# Patient Record
Sex: MALE | Race: OTHER | Employment: STUDENT | URBAN - METROPOLITAN AREA
[De-identification: names, ages, dates, MRNs, and addresses within clinical notes are randomized per-mention and may not be internally consistent; named-entity substitution may affect disease eponyms.]

---
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
## 2023-05-22 ENCOUNTER — HOSPITAL ENCOUNTER (OUTPATIENT)
Age: 23
Discharge: HOME | End: 2023-05-22
Payer: COMMERCIAL

## 2023-05-22 VITALS
SYSTOLIC BLOOD PRESSURE: 124 MMHG | HEART RATE: 56 BPM | TEMPERATURE: 98 F | OXYGEN SATURATION: 98 % | RESPIRATION RATE: 18 BRPM | DIASTOLIC BLOOD PRESSURE: 60 MMHG

## 2023-05-22 DIAGNOSIS — M25.562 PAIN IN BOTH KNEES, UNSPECIFIED CHRONICITY: Primary | ICD-10-CM

## 2023-05-22 DIAGNOSIS — M25.561 PAIN IN BOTH KNEES, UNSPECIFIED CHRONICITY: Primary | ICD-10-CM

## 2023-05-22 PROCEDURE — 99203 OFFICE O/P NEW LOW 30 MIN: CPT | Performed by: NURSE PRACTITIONER

## 2023-05-22 NOTE — DISCHARGE INSTRUCTIONS
Take over-the-counter ibuprofen for pain or discomfort ice pack to the knees 2-3 times per day inside of a pillowcase or cough. Avoid any strenuous exercises such as lunges squats and running. Follow-up with your primary care provider 2 to 3 days for additional pain management options follow-up with the orthopedic specialist.  If you develop severe knee pain swelling the skin is red or hot to touch pus or drainage from the skin or fever this would be concerning for infection go to the nearest emergency department.

## 2023-05-22 NOTE — ED INITIAL ASSESSMENT (HPI)
Pt with intermittent bilateral knee pain pain x5-6 months ago. Pt tated pain 8/10. Pt denies trauma.

## 2023-06-12 ENCOUNTER — MED REC SCAN ONLY (OUTPATIENT)
Dept: FAMILY MEDICINE CLINIC | Facility: CLINIC | Age: 23
End: 2023-06-12

## 2024-05-07 ENCOUNTER — EKG ENCOUNTER (OUTPATIENT)
Dept: LAB | Age: 24
End: 2024-05-07
Attending: FAMILY MEDICINE
Payer: COMMERCIAL

## 2024-05-07 ENCOUNTER — OFFICE VISIT (OUTPATIENT)
Dept: FAMILY MEDICINE CLINIC | Facility: CLINIC | Age: 24
End: 2024-05-07
Payer: COMMERCIAL

## 2024-05-07 ENCOUNTER — LAB ENCOUNTER (OUTPATIENT)
Dept: LAB | Age: 24
End: 2024-05-07
Attending: FAMILY MEDICINE
Payer: COMMERCIAL

## 2024-05-07 VITALS
SYSTOLIC BLOOD PRESSURE: 130 MMHG | BODY MASS INDEX: 32.23 KG/M2 | HEIGHT: 72 IN | HEART RATE: 68 BPM | DIASTOLIC BLOOD PRESSURE: 81 MMHG | WEIGHT: 238 LBS

## 2024-05-07 DIAGNOSIS — H61.22 IMPACTED CERUMEN OF LEFT EAR: ICD-10-CM

## 2024-05-07 DIAGNOSIS — Z00.00 PHYSICAL EXAM: Primary | ICD-10-CM

## 2024-05-07 DIAGNOSIS — Z00.00 PHYSICAL EXAM: ICD-10-CM

## 2024-05-07 LAB
ALBUMIN SERPL-MCNC: 4.5 G/DL (ref 3.2–4.8)
ALBUMIN/GLOB SERPL: 1.6 {RATIO} (ref 1–2)
ALP LIVER SERPL-CCNC: 98 U/L
ALT SERPL-CCNC: 30 U/L
ANION GAP SERPL CALC-SCNC: 6 MMOL/L (ref 0–18)
AST SERPL-CCNC: 20 U/L (ref ?–34)
ATRIAL RATE: 48 BPM
BASOPHILS # BLD AUTO: 0.03 X10(3) UL (ref 0–0.2)
BASOPHILS NFR BLD AUTO: 0.5 %
BILIRUB SERPL-MCNC: 0.8 MG/DL (ref 0.3–1.2)
BILIRUB UR QL: NEGATIVE
BUN BLD-MCNC: 19 MG/DL (ref 9–23)
BUN/CREAT SERPL: 16.7 (ref 10–20)
CALCIUM BLD-MCNC: 9.6 MG/DL (ref 8.7–10.4)
CHLORIDE SERPL-SCNC: 109 MMOL/L (ref 98–112)
CHOLEST SERPL-MCNC: 174 MG/DL (ref ?–200)
CO2 SERPL-SCNC: 28 MMOL/L (ref 21–32)
COLOR UR: YELLOW
CREAT BLD-MCNC: 1.14 MG/DL
DEPRECATED RDW RBC AUTO: 41.1 FL (ref 35.1–46.3)
EGFRCR SERPLBLD CKD-EPI 2021: 92 ML/MIN/1.73M2 (ref 60–?)
EOSINOPHIL # BLD AUTO: 0.28 X10(3) UL (ref 0–0.7)
EOSINOPHIL NFR BLD AUTO: 4.9 %
ERYTHROCYTE [DISTWIDTH] IN BLOOD BY AUTOMATED COUNT: 13.2 % (ref 11–15)
EST. AVERAGE GLUCOSE BLD GHB EST-MCNC: 108 MG/DL (ref 68–126)
FASTING PATIENT LIPID ANSWER: YES
FASTING STATUS PATIENT QL REPORTED: YES
GLOBULIN PLAS-MCNC: 2.9 G/DL (ref 2–3.5)
GLUCOSE BLD-MCNC: 99 MG/DL (ref 70–99)
GLUCOSE UR-MCNC: NORMAL MG/DL
HBA1C MFR BLD: 5.4 % (ref ?–5.7)
HCT VFR BLD AUTO: 47.8 %
HDLC SERPL-MCNC: 63 MG/DL (ref 40–59)
HGB BLD-MCNC: 15.1 G/DL
HGB UR QL STRIP.AUTO: NEGATIVE
IMM GRANULOCYTES # BLD AUTO: 0.01 X10(3) UL (ref 0–1)
IMM GRANULOCYTES NFR BLD: 0.2 %
KETONES UR-MCNC: NEGATIVE MG/DL
LDLC SERPL CALC-MCNC: 97 MG/DL (ref ?–100)
LEUKOCYTE ESTERASE UR QL STRIP.AUTO: NEGATIVE
LYMPHOCYTES # BLD AUTO: 2.32 X10(3) UL (ref 1–4)
LYMPHOCYTES NFR BLD AUTO: 40.6 %
MCH RBC QN AUTO: 26.7 PG (ref 26–34)
MCHC RBC AUTO-ENTMCNC: 31.6 G/DL (ref 31–37)
MCV RBC AUTO: 84.6 FL
MONOCYTES # BLD AUTO: 0.45 X10(3) UL (ref 0.1–1)
MONOCYTES NFR BLD AUTO: 7.9 %
NEUTROPHILS # BLD AUTO: 2.62 X10 (3) UL (ref 1.5–7.7)
NEUTROPHILS # BLD AUTO: 2.62 X10(3) UL (ref 1.5–7.7)
NEUTROPHILS NFR BLD AUTO: 45.9 %
NITRITE UR QL STRIP.AUTO: NEGATIVE
NONHDLC SERPL-MCNC: 111 MG/DL (ref ?–130)
OSMOLALITY SERPL CALC.SUM OF ELEC: 298 MOSM/KG (ref 275–295)
P AXIS: 48 DEGREES
P-R INTERVAL: 204 MS
PH UR: 6 [PH] (ref 5–8)
PLATELET # BLD AUTO: 262 10(3)UL (ref 150–450)
POTASSIUM SERPL-SCNC: 4.3 MMOL/L (ref 3.5–5.1)
PROT SERPL-MCNC: 7.4 G/DL (ref 5.7–8.2)
PROT UR-MCNC: NEGATIVE MG/DL
Q-T INTERVAL: 408 MS
QRS DURATION: 92 MS
QTC CALCULATION (BEZET): 364 MS
R AXIS: 77 DEGREES
RBC # BLD AUTO: 5.65 X10(6)UL
SODIUM SERPL-SCNC: 143 MMOL/L (ref 136–145)
SP GR UR STRIP: 1.03 (ref 1–1.03)
T AXIS: 42 DEGREES
TRIGL SERPL-MCNC: 74 MG/DL (ref 30–149)
TSI SER-ACNC: 1.22 MIU/ML (ref 0.55–4.78)
UROBILINOGEN UR STRIP-ACNC: NORMAL
VENTRICULAR RATE: 48 BPM
VIT D+METAB SERPL-MCNC: 23.3 NG/ML (ref 30–100)
VLDLC SERPL CALC-MCNC: 12 MG/DL (ref 0–30)
WBC # BLD AUTO: 5.7 X10(3) UL (ref 4–11)

## 2024-05-07 PROCEDURE — 80053 COMPREHEN METABOLIC PANEL: CPT | Performed by: FAMILY MEDICINE

## 2024-05-07 PROCEDURE — 93010 ELECTROCARDIOGRAM REPORT: CPT | Performed by: INTERNAL MEDICINE

## 2024-05-07 PROCEDURE — 36415 COLL VENOUS BLD VENIPUNCTURE: CPT | Performed by: FAMILY MEDICINE

## 2024-05-07 PROCEDURE — 93005 ELECTROCARDIOGRAM TRACING: CPT

## 2024-05-07 PROCEDURE — 84443 ASSAY THYROID STIM HORMONE: CPT | Performed by: FAMILY MEDICINE

## 2024-05-07 PROCEDURE — 90471 IMMUNIZATION ADMIN: CPT | Performed by: FAMILY MEDICINE

## 2024-05-07 PROCEDURE — 80061 LIPID PANEL: CPT | Performed by: FAMILY MEDICINE

## 2024-05-07 PROCEDURE — 82306 VITAMIN D 25 HYDROXY: CPT

## 2024-05-07 PROCEDURE — 85025 COMPLETE CBC W/AUTO DIFF WBC: CPT | Performed by: FAMILY MEDICINE

## 2024-05-07 PROCEDURE — 83036 HEMOGLOBIN GLYCOSYLATED A1C: CPT | Performed by: FAMILY MEDICINE

## 2024-05-07 PROCEDURE — 99385 PREV VISIT NEW AGE 18-39: CPT | Performed by: FAMILY MEDICINE

## 2024-05-07 PROCEDURE — 90715 TDAP VACCINE 7 YRS/> IM: CPT | Performed by: FAMILY MEDICINE

## 2024-05-07 PROCEDURE — 81001 URINALYSIS AUTO W/SCOPE: CPT | Performed by: FAMILY MEDICINE

## 2024-05-07 RX ORDER — ERGOCALCIFEROL 1.25 MG/1
50000 CAPSULE ORAL WEEKLY
Qty: 12 CAPSULE | Refills: 3 | Status: SHIPPED | OUTPATIENT
Start: 2024-05-07 | End: 2025-05-07

## 2024-05-07 NOTE — PROGRESS NOTES
5/7/2024  9:42 AM    Luis Calhoun is a 23 year old male.    Chief complaint(s):   Chief Complaint   Patient presents with    Establish Care     Patient presents to establish care, has not seen a doctor in the past 5-6 years.     Fatigue     Patient states that for the past couple of month he has been experiencing fatigue with loss of energy. Would like blood work to check his levels.      HPI:     Luis Calhoun primary complaint is regarding CPE.       Luis Calhoun is a 23 year old male is here for routine periodic health screening and examination.  His last physical exam was many years ago.  His last ECG was none. His last diabetes screening test was many years ago and was normal.   His last cholesterol test was 6 year ago and was normal.  Last dentist visit was  24 months ago.  He is not current with his Td immunization.   Patient last colonoscopy was none.  He is not a smoker.        HISTORY:  History reviewed. No pertinent past medical history.   History reviewed. No pertinent surgical history.   History reviewed. No pertinent family history.   Social History:   Social History     Socioeconomic History    Marital status: Single   Tobacco Use    Smoking status: Never    Smokeless tobacco: Never    Tobacco comments:     No exposure to tobacco smoke.   Vaping Use    Vaping status: Never Used   Substance and Sexual Activity    Alcohol use: No     Comment: Socially    Drug use: No    Sexual activity: Never        Immunizations:   Immunization History   Administered Date(s) Administered    DTAP 02/19/2000, 04/22/2000, 06/10/2000, 06/12/2001, 05/24/2005    FLULAVAL 6 months & older 0.5 ml Prefilled syringe (71886) 12/11/2017    Fluvirin, 3 Years & >, Im 11/11/2006, 09/18/2010, 09/08/2011, 09/22/2012, 10/05/2013    HEP A,Ped/Adol,(2 Dose) 09/08/2011, 07/28/2014    HEP B, Ped/Adol 06/10/2000, 07/10/2000, 01/09/2001    HIB 02/19/2000, 04/22/2000, 06/10/2000, 06/12/2001    HPV (Gardasil) 07/07/2012, 09/22/2012,  04/13/2013    IPV 02/19/2000, 04/22/2000, 06/10/2000, 06/12/2001, 05/24/2005    Influenza 11/23/2015    MMR 01/09/2001, 05/24/2005    Meningococcal-Menactra 08/09/2016, 12/11/2017    Pneumococcal (Prevnar 13) 06/12/2001    TDAP 07/07/2012    Tb Intradermal Test 09/18/2010    Varicella 05/24/2005, 09/08/2011   Pended Date(s) Pended    TDAP 05/07/2024       Medications (Active prior to today's visit):  Current Outpatient Medications   Medication Sig Dispense Refill    carbamide peroxide (DEBROX) 6.5 % Otic Solution Place 5 drops into the left ear 2 (two) times daily. 15 mL 0    Imiquimod 5 % External Cream Apply 1 Application topically 3 (three) times a week. (Patient not taking: Reported on 5/7/2024) 12 each 1       Allergies:  No Known Allergies      ROS:   Review of Systems   Constitutional:  Negative for appetite change, fatigue and fever.   HENT:  Negative for hearing loss and nosebleeds.    Eyes:  Negative for pain and visual disturbance.   Respiratory:  Negative for apnea and shortness of breath.    Cardiovascular:  Negative for chest pain, palpitations and leg swelling.   Gastrointestinal:  Negative for abdominal pain, blood in stool, constipation, diarrhea, nausea and vomiting.   Endocrine: Negative for polydipsia and polyuria.   Genitourinary:  Negative for decreased urine volume, frequency and hematuria.        No nocturia   Musculoskeletal:  Negative for arthralgias.   Skin:  Negative for rash.        Toenail abnl, right big toenail   Neurological:  Negative for dizziness, syncope and headaches.   Psychiatric/Behavioral:  Negative for dysphoric mood and sleep disturbance.        PHYSICAL EXAM:   VS: /81   Pulse 68   Ht 6' (1.829 m)   Wt 238 lb (108 kg)   BMI 32.28 kg/m²     Physical Exam  Vitals reviewed.   Constitutional:       Appearance: Normal appearance.      Comments:      HENT:      Head: Normocephalic.      Right Ear: Hearing, tympanic membrane and ear canal normal.      Left Ear:  Hearing, tympanic membrane and ear canal normal.      Ears:      Comments: Left ear cerumen impaction     Nose: Nose normal. No rhinorrhea.      Mouth/Throat:      Mouth: Mucous membranes are moist.      Pharynx: Oropharynx is clear.   Eyes:      Extraocular Movements: Extraocular movements intact.      Conjunctiva/sclera: Conjunctivae normal.      Pupils: Pupils are equal, round, and reactive to light.   Neck:      Thyroid: No thyroid mass.      Vascular: No carotid bruit.   Cardiovascular:      Rate and Rhythm: Normal rate and regular rhythm.      Heart sounds: Normal heart sounds, S1 normal and S2 normal. No murmur heard.  Pulmonary:      Effort: Pulmonary effort is normal.      Breath sounds: Normal breath sounds.   Abdominal:      General: Abdomen is flat. Bowel sounds are normal.      Palpations: Abdomen is soft. There is no hepatomegaly, splenomegaly or mass.      Tenderness: There is no abdominal tenderness.      Hernia: No hernia is present.   Musculoskeletal:      Cervical back: Neck supple.      Comments: Spinal exam without scoliosis.      Feet:      Comments: Thick, stained brittle toenail, right big toe  Lymphadenopathy:      Cervical: No cervical adenopathy.   Skin:     General: Skin is warm.      Findings: No rash.   Neurological:      General: No focal deficit present.      Mental Status: He is alert.      Deep Tendon Reflexes:      Reflex Scores:       Patellar reflexes are 2+ on the right side and 2+ on the left side.  Psychiatric:         Attention and Perception: Attention normal.         Mood and Affect: Mood and affect normal.         LABORATORY RESULTS:     EKG / Spirometry : -     Radiology: No results found.     ASSESSMENT/PLAN:   Assessment   Encounter Diagnoses   Name Primary?    Physical exam Yes    Impacted cerumen of left ear          CPE PLAN:    LABS / TEST & ORDERS for today's visit :Blood test(s) ordered today for send out to [facility] lab:  Orders Placed This Encounter    Procedures    CBC With Differential With Platelet    Comp Metabolic Panel (14)    Hemoglobin A1C    Lipid Panel    TSH W Reflex To Free T4    Vitamin D    Urinalysis with Culture Reflex    TETANUS, DIPHTHERIA TOXOIDS AND ACELLULAR PERTUSIS VACCINE (TDAP), >7 YEARS, IM USE   Rx Debrox otic soln  Referrals: TETANUS, DIPHTHERIA TOXOIDS AND ACELLULAR PERTUSIS VACCINE (TDAP), >7 YEARS, IM USE  EKG 12-LEAD  In-House; Urine dip.  Test/Procedures done today include: EKG.    IMMUNIZATIONS:  Tdap, given today.    RECOMMENDATIONS given include: ANTICIPATORY GUIDANCE  topics covered today include: safety (i.e. seat belts, helmets, sunscreen, protective sports gear ), nutrition (i.e. healthy meals and snacks (i.e. avoid junk food and high-carbohydrate foods); athletic conditioning, fluids; low fat milk, limit to less than 20 oz. a day; dental care ), and Healthy habits& Social competence & Responsibilities: Recommendations on physical activity; exercise daily or at least 3 times a week for 30-60 minutes doing cardiovascular exercise. Encourage to maintain the best physical and dental hygiene possible.  Consider a  if overweight and/or having difficult in staying active; attempt to keep a schedule that includes an adequate sleep and physical exercise / activities Patient educated on doing regular self testicular exam. Patient was educated on sexual transmitted disease. Best to abstain from sexual intercourse until he is ready to form a family. Use of condoms may prevent transmission of infections as well as pregnancy.    FOLLOW-UP: Schedule a follow-up visit in 12 months.   RTC for ear irrigation         Orders This Visit:  Orders Placed This Encounter   Procedures    CBC With Differential With Platelet    Comp Metabolic Panel (14)    Hemoglobin A1C    Lipid Panel    TSH W Reflex To Free T4    Vitamin D    Urinalysis with Culture Reflex    TETANUS, DIPHTHERIA TOXOIDS AND ACELLULAR PERTUSIS VACCINE (TDAP), >7  YEARS, IM USE       Meds This Visit:  Requested Prescriptions     Signed Prescriptions Disp Refills    carbamide peroxide (DEBROX) 6.5 % Otic Solution 15 mL 0     Sig: Place 5 drops into the left ear 2 (two) times daily.       Imaging & Referrals:  TETANUS, DIPHTHERIA TOXOIDS AND ACELLULAR PERTUSIS VACCINE (TDAP), >7 YEARS, IM USE  EKG 12-LEAD         SE TELLO MD

## 2024-05-16 ENCOUNTER — OFFICE VISIT (OUTPATIENT)
Dept: FAMILY MEDICINE CLINIC | Facility: CLINIC | Age: 24
End: 2024-05-16

## 2024-05-16 VITALS
HEIGHT: 72 IN | HEART RATE: 54 BPM | SYSTOLIC BLOOD PRESSURE: 112 MMHG | DIASTOLIC BLOOD PRESSURE: 69 MMHG | BODY MASS INDEX: 32.02 KG/M2 | WEIGHT: 236.38 LBS

## 2024-05-16 DIAGNOSIS — H61.22 IMPACTED CERUMEN OF LEFT EAR: Primary | ICD-10-CM

## 2024-05-16 NOTE — PROGRESS NOTES
5/16/2024  11:03 AM    Luis Calhoun is a 23 year old male.    Chief complaint(s):   Chief Complaint   Patient presents with    Procedure     Ear irrigation     Derm Problem     X1 week bruise      HPI:     Luis Calhoun primary complaint is regarding as above.     Patient is a 23-year-old male who presents for irrigation.  Verbal consent was obtained.  Irrigation is being done due to cerumen impaction.  Denies any pain or fever.      HISTORY:  No past medical history on file.   No past surgical history on file.   No family history on file.   Social History:   Social History     Socioeconomic History    Marital status: Single   Tobacco Use    Smoking status: Never    Smokeless tobacco: Never    Tobacco comments:     No exposure to tobacco smoke.   Vaping Use    Vaping status: Never Used   Substance and Sexual Activity    Alcohol use: No     Comment: Socially    Drug use: No    Sexual activity: Never        Immunizations:   Immunization History   Administered Date(s) Administered    Covid-19 Vaccine Pfizer 30 mcg/0.3 ml 05/16/2021, 06/06/2021    DTAP 02/19/2000, 04/22/2000, 06/10/2000, 06/12/2001, 05/24/2005    FLULAVAL 6 months & older 0.5 ml Prefilled syringe (37927) 12/11/2017    Fluvirin, 3 Years & >, Im 11/11/2006, 09/18/2010, 09/08/2011, 09/22/2012, 10/05/2013    HEP A,Ped/Adol,(2 Dose) 09/08/2011, 07/28/2014    HEP B, Ped/Adol 06/10/2000, 07/10/2000, 01/09/2001    HIB 02/19/2000, 04/22/2000, 06/10/2000, 06/12/2001    HPV (Gardasil) 07/07/2012, 09/22/2012, 04/13/2013    IPV 02/19/2000, 04/22/2000, 06/10/2000, 06/12/2001, 05/24/2005    Influenza 11/23/2015    MMR 01/09/2001, 05/24/2005    Meningococcal-Menactra 08/09/2016, 12/11/2017    Pneumococcal (Prevnar 13) 06/12/2001    TDAP 07/07/2012, 05/07/2024    Tb Intradermal Test 09/18/2010    Varicella 05/24/2005, 09/08/2011       Medications (Active prior to today's visit):  Current Outpatient Medications   Medication Sig Dispense Refill    carbamide peroxide  (DEBROX) 6.5 % Otic Solution Place 5 drops into the left ear 2 (two) times daily. (Patient not taking: Reported on 5/16/2024) 15 mL 0    ergocalciferol 1.25 MG (92816 UT) Oral Cap Take 1 capsule (50,000 Units total) by mouth once a week. (Patient not taking: Reported on 5/16/2024) 12 capsule 3    Imiquimod 5 % External Cream Apply 1 Application topically 3 (three) times a week. (Patient not taking: Reported on 5/7/2024) 12 each 1       Allergies:  No Known Allergies      ROS:   Review of Systems   Constitutional:  Negative for appetite change, fatigue and fever.   HENT:  Positive for hearing loss (ear wax).    Respiratory:  Negative for shortness of breath.    Cardiovascular:  Negative for chest pain.   Gastrointestinal:  Negative for abdominal pain.   Musculoskeletal:  Negative for myalgias.   Skin:  Negative for rash.   Neurological:  Negative for dizziness, weakness and headaches.       PHYSICAL EXAM:   VS: /69   Pulse 54   Ht 6' (1.829 m)   Wt 236 lb 6.4 oz (107.2 kg)   BMI 32.06 kg/m²     Physical Exam  Vitals reviewed.   Constitutional:       Appearance: Normal appearance. He is well-developed.   HENT:      Head: Normocephalic.      Ears:      Comments: Cerumen impaction  Eyes:      General: No scleral icterus.     Conjunctiva/sclera: Conjunctivae normal.   Cardiovascular:      Rate and Rhythm: Normal rate.   Pulmonary:      Effort: Pulmonary effort is normal.   Musculoskeletal:      Cervical back: Neck supple.   Skin:     Findings: No rash.   Psychiatric:         Mood and Affect: Mood normal.       Ear Irrigation procedure note:    Luis Calhoun is here today for an ear irrigation. Indication is due to cerumen impaction of the bilateral ear(s).    Procedure: Cerumen impaction is noted in the bilateral ear.  The degree of ear wax accumulation is moderate.  The procedure was done by using the  Ear Wash Irrigation System.  Removed from ear was hard balls of wax.  The patient tolerated the procedure  well.  There were no complications.      Follow up; RTC prn      Se Tello MD        LABORATORY RESULTS:     EKG / Spirometry : -     Radiology: No results found.     ASSESSMENT/PLAN:   Assessment   Encounter Diagnosis   Name Primary?    Impacted cerumen of left ear Yes     Ear irrigation done     RECOMMENDATIONS given include: Patient was reassured of  his medical condition and all questions and concerns were answered. Patient was informed to please, call our office with any new or further questions or concerns that may come up in the near future. Notify Dr Tello or the [facility] if there is a deterioration or worsening of the medical condition. Also, inform the doctor with any new symptoms or medications' side effects.      FOLLOW-UP: Schedule a follow-up visit in  prn.            Orders This Visit:  No orders of the defined types were placed in this encounter.      Meds This Visit:  Requested Prescriptions      No prescriptions requested or ordered in this encounter       Imaging & Referrals:  None         SE TELLO MD

## 2024-06-06 ENCOUNTER — OFFICE VISIT (OUTPATIENT)
Dept: FAMILY MEDICINE CLINIC | Facility: CLINIC | Age: 24
End: 2024-06-06
Payer: COMMERCIAL

## 2024-06-06 VITALS
HEIGHT: 72 IN | HEART RATE: 62 BPM | BODY MASS INDEX: 32.1 KG/M2 | WEIGHT: 237 LBS | SYSTOLIC BLOOD PRESSURE: 127 MMHG | DIASTOLIC BLOOD PRESSURE: 68 MMHG

## 2024-06-06 DIAGNOSIS — B35.1 ONYCHOMYCOSIS OF GREAT TOE: Primary | ICD-10-CM

## 2024-06-06 PROCEDURE — 99213 OFFICE O/P EST LOW 20 MIN: CPT | Performed by: FAMILY MEDICINE

## 2024-06-06 RX ORDER — TERBINAFINE HYDROCHLORIDE 250 MG/1
250 TABLET ORAL DAILY
Qty: 30 TABLET | Refills: 2 | Status: SHIPPED | OUTPATIENT
Start: 2024-06-06 | End: 2024-08-29

## 2024-06-06 NOTE — PROGRESS NOTES
6/6/2024  9:29 AM    Luis Calhoun is a 23 year old male.    Chief complaint(s):   Chief Complaint   Patient presents with    Fungus Nails     Right big toe nail fungus      HPI:     Luis Calhoun primary complaint is regarding as above.     Patient complains of toenail onychomycosis.  he reports involvement of the right  toe ( great toenail) .  The duration of this condition is unclear.  he reports 0 previous episodes of onychomycosis treament.  Symptoms associated with the infection include: Negative for loss of the nail, Positive for peeling of the nail and splitting of the nail.  Risk factors for infection include local trauma.  Pertinent medical history is  unremarkable.  Presently taking antifungal medication, Lamisil starting June 2024.  Recent nail KOH showed pending  Hyphea, but fungal culture was ?. Luis Calhoun agreed to its possible side effect and agrees to begin treatment.        HISTORY:  No past medical history on file.   No past surgical history on file.   No family history on file.   Social History:   Social History     Socioeconomic History    Marital status: Single   Tobacco Use    Smoking status: Never    Smokeless tobacco: Never    Tobacco comments:     No exposure to tobacco smoke.   Vaping Use    Vaping status: Never Used   Substance and Sexual Activity    Alcohol use: No     Comment: Socially    Drug use: No    Sexual activity: Never        Immunizations:   Immunization History   Administered Date(s) Administered    Covid-19 Vaccine Pfizer 30 mcg/0.3 ml 05/16/2021, 06/06/2021    DTAP 02/19/2000, 04/22/2000, 06/10/2000, 06/12/2001, 05/24/2005    FLULAVAL 6 months & older 0.5 ml Prefilled syringe (39007) 12/11/2017    Fluvirin, 3 Years & >, Im 11/11/2006, 09/18/2010, 09/08/2011, 09/22/2012, 10/05/2013    HEP A,Ped/Adol,(2 Dose) 09/08/2011, 07/28/2014    HEP B, Ped/Adol 06/10/2000, 07/10/2000, 01/09/2001    HIB 02/19/2000, 04/22/2000, 06/10/2000, 06/12/2001    HPV (Gardasil) 07/07/2012,  09/22/2012, 04/13/2013    IPV 02/19/2000, 04/22/2000, 06/10/2000, 06/12/2001, 05/24/2005    Influenza 11/23/2015    MMR 01/09/2001, 05/24/2005    Meningococcal-Menactra 08/09/2016, 12/11/2017    Pneumococcal (Prevnar 13) 06/12/2001    TDAP 07/07/2012, 05/07/2024    Tb Intradermal Test 09/18/2010    Varicella 05/24/2005, 09/08/2011       Medications (Active prior to today's visit):  Current Outpatient Medications   Medication Sig Dispense Refill    terbinafine (LAMISIL) 250 MG Oral Tab Take 1 tablet (250 mg total) by mouth daily. 30 tablet 2    ergocalciferol 1.25 MG (98617 UT) Oral Cap Take 1 capsule (50,000 Units total) by mouth once a week. 12 capsule 3       Allergies:  No Known Allergies      ROS:   Review of Systems   Constitutional:  Negative for appetite change, fatigue and fever.   Respiratory:  Negative for shortness of breath.    Cardiovascular:  Negative for chest pain.   Gastrointestinal:  Negative for abdominal pain.   Musculoskeletal:  Negative for myalgias.        Toenail fungus   Skin:  Negative for rash.   Neurological:  Negative for dizziness, weakness and headaches.       PHYSICAL EXAM:   VS: /68 (BP Location: Right arm, Patient Position: Sitting, Cuff Size: adult)   Pulse 62   Ht 6' (1.829 m)   Wt 237 lb (107.5 kg)   BMI 32.14 kg/m²     Physical Exam  Vitals reviewed.   Constitutional:       Appearance: Normal appearance. He is well-developed.   HENT:      Head: Normocephalic.   Eyes:      General: No scleral icterus.     Conjunctiva/sclera: Conjunctivae normal.   Cardiovascular:      Rate and Rhythm: Normal rate.   Pulmonary:      Effort: Pulmonary effort is normal.   Musculoskeletal:      Cervical back: Neck supple.   Feet:      Comments: Right big toenail brown stained, thick toe nail  Skin:     Findings: No rash.   Psychiatric:         Mood and Affect: Mood normal.         LABORATORY RESULTS:       EKG / Spirometry : -     Radiology: No results found.     ASSESSMENT/PLAN:    Assessment   Encounter Diagnosis   Name Primary?    Onychomycosis of great toe Yes         MEDICATIONS:   Requested Prescriptions     Signed Prescriptions Disp Refills    terbinafine (LAMISIL) 250 MG Oral Tab 30 tablet 2     Sig: Take 1 tablet (250 mg total) by mouth daily.   .     LABORATORY & ORDERS:   Orders Placed This Encounter   Procedures    Fungus Hair, Skin, Nail Culture (Dermatophyte)       RECOMMENDATIONS given include: keep affected area clean and dry, especially between the toes, watch for signs of infection such as redness, swelling, or discharge, wear absorbent cotton socks, and air out shoes when not wearing them.    FOLLOW-UP: Schedule a follow-up visit in 3 months.              Orders This Visit:  Orders Placed This Encounter   Procedures    Fungus Hair, Skin, Nail Culture (Dermatophyte)       Meds This Visit:  Requested Prescriptions     Signed Prescriptions Disp Refills    terbinafine (LAMISIL) 250 MG Oral Tab 30 tablet 2     Sig: Take 1 tablet (250 mg total) by mouth daily.       Imaging & Referrals:  None         SE TELLO MD

## 2024-09-26 ENCOUNTER — LAB ENCOUNTER (OUTPATIENT)
Dept: LAB | Age: 24
End: 2024-09-26
Attending: FAMILY MEDICINE
Payer: COMMERCIAL

## 2024-09-26 ENCOUNTER — OFFICE VISIT (OUTPATIENT)
Dept: FAMILY MEDICINE CLINIC | Facility: CLINIC | Age: 24
End: 2024-09-26
Payer: COMMERCIAL

## 2024-09-26 VITALS
WEIGHT: 234 LBS | BODY MASS INDEX: 31.69 KG/M2 | SYSTOLIC BLOOD PRESSURE: 128 MMHG | HEIGHT: 72 IN | DIASTOLIC BLOOD PRESSURE: 75 MMHG | HEART RATE: 62 BPM

## 2024-09-26 DIAGNOSIS — F51.01 PRIMARY INSOMNIA: ICD-10-CM

## 2024-09-26 DIAGNOSIS — B35.1 ONYCHOMYCOSIS OF GREAT TOE: Primary | ICD-10-CM

## 2024-09-26 DIAGNOSIS — B35.1 ONYCHOMYCOSIS OF GREAT TOE: ICD-10-CM

## 2024-09-26 LAB
ALT SERPL-CCNC: 19 U/L
AST SERPL-CCNC: 22 U/L (ref ?–34)

## 2024-09-26 PROCEDURE — 84450 TRANSFERASE (AST) (SGOT): CPT

## 2024-09-26 PROCEDURE — 36415 COLL VENOUS BLD VENIPUNCTURE: CPT

## 2024-09-26 PROCEDURE — 84460 ALANINE AMINO (ALT) (SGPT): CPT

## 2024-09-26 PROCEDURE — 99213 OFFICE O/P EST LOW 20 MIN: CPT | Performed by: FAMILY MEDICINE

## 2024-09-26 RX ORDER — TERBINAFINE HYDROCHLORIDE 250 MG/1
250 TABLET ORAL DAILY
Qty: 30 TABLET | Refills: 2 | Status: SHIPPED | OUTPATIENT
Start: 2024-09-26 | End: 2024-12-19

## 2024-09-26 NOTE — PROGRESS NOTES
9/26/2024  9:07 AM    Luis Calhoun is a 23 year old male.    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     Patient complains of toenail onychomycosis.  He reports involvement of the right  toe ( great toenail) .  The duration of this condition is unclear.  He reports 0 previous episodes of onychomycosis treament.  Symptoms associated with the infection include: Negative for loss of the nail, Positive for peeling of the nail and splitting of the nail.  Risk factors for infection include local trauma.  Pertinent medical history is  unremarkable.  Presently taking antifungal medication, Lamisil starting June 2024.  Recent nail KOH showed pending  Hyphea, but fungal culture was +. Luis Calhoun agreed to its possible side effect and agrees to begin treatment.        Insomnia details; this has been noted for the past 3 months.  Sleep has been disrupted by a delay in initiation of sleep, frequent awakenings, and early morning awakening.  On average, she estimates that she gets  6 hours of sleep per night.  Associated symptoms include agitation and anxiety.  Eli denies symptoms of apnea, depression, excessive daytime fatigue, manic symptoms or snoring.  There are no current identifiable stressors.  Medical history is negative for alcohol use, anxiety/stress, depression, restless legs syndrome and sleep apnea.  See medication list for a list of her current prescriptions.  Remedies that she has already tried include OTC sleep aids Melatonin.         HISTORY:  No past medical history on file.   No past surgical history on file.   No family history on file.   Social History:   Social History     Socioeconomic History    Marital status: Single   Tobacco Use    Smoking status: Never     Smokeless tobacco: Never    Tobacco comments:     No exposure to tobacco smoke.   Vaping Use    Vaping status: Never Used   Substance and Sexual Activity    Alcohol use: No     Comment: Socially    Drug use: No    Sexual activity: Never        Immunizations:   Immunization History   Administered Date(s) Administered    Covid-19 Vaccine Pfizer 30 mcg/0.3 ml 05/16/2021, 06/06/2021    DTAP 02/19/2000, 04/22/2000, 06/10/2000, 06/12/2001, 05/24/2005    FLULAVAL 6 months & older 0.5 ml Prefilled syringe (05567) 12/11/2017    Fluvirin, 3 Years & >, Im 11/11/2006, 09/18/2010, 09/08/2011, 09/22/2012, 10/05/2013    HEP A,Ped/Adol,(2 Dose) 09/08/2011, 07/28/2014    HEP B, Ped/Adol 06/10/2000, 07/10/2000, 01/09/2001    HIB 02/19/2000, 04/22/2000, 06/10/2000, 06/12/2001    HPV (Gardasil) 07/07/2012, 09/22/2012, 04/13/2013    IPV 02/19/2000, 04/22/2000, 06/10/2000, 06/12/2001, 05/24/2005    Influenza 11/23/2015    MMR 01/09/2001, 05/24/2005    Meningococcal-Menactra 08/09/2016, 12/11/2017    Pneumococcal (Prevnar 13) 06/12/2001    TDAP 07/07/2012, 05/07/2024    Tb Intradermal Test 09/18/2010    Varicella 05/24/2005, 09/08/2011       Medications (Active prior to today's visit):  Current Outpatient Medications   Medication Sig Dispense Refill    terbinafine (LAMISIL) 250 MG Oral Tab Take 1 tablet (250 mg total) by mouth daily. 30 tablet 2    ergocalciferol 1.25 MG (53297 UT) Oral Cap Take 1 capsule (50,000 Units total) by mouth once a week. (Patient not taking: Reported on 9/26/2024) 12 capsule 3       Allergies:  No Known Allergies      ROS:   Review of Systems   Constitutional:  Negative for appetite change, fatigue and fever.   Respiratory:  Negative for shortness of breath.    Cardiovascular:  Negative for chest pain.   Gastrointestinal:  Negative for abdominal pain.   Musculoskeletal:  Negative for myalgias.   Skin:  Negative for rash.        Toenail fungus   Neurological:  Negative for dizziness, weakness and headaches.        PHYSICAL EXAM:   VS: /75 (BP Location: Right arm, Patient Position: Sitting, Cuff Size: adult)   Pulse 62   Ht 6' (1.829 m)   Wt 234 lb (106.1 kg)   BMI 31.74 kg/m²     Physical Exam  Vitals reviewed.   Constitutional:       Appearance: Normal appearance. He is well-developed.   HENT:      Head: Normocephalic.   Eyes:      General: No scleral icterus.     Conjunctiva/sclera: Conjunctivae normal.   Cardiovascular:      Rate and Rhythm: Normal rate.   Pulmonary:      Effort: Pulmonary effort is normal.   Musculoskeletal:      Cervical back: Neck supple.   Skin:     Findings: No rash.      Comments: Toenail 60% clear   Psychiatric:         Mood and Affect: Mood normal.         LABORATORY RESULTS:     EKG / Spirometry : -     Radiology: No results found.     ASSESSMENT/PLAN:   Assessment   Encounter Diagnoses   Name Primary?    Onychomycosis of great toe Yes    Primary insomnia        1. Onychomycosis of great toe    MEDICATIONS:   Requested Prescriptions     Signed Prescriptions Disp Refills    terbinafine (LAMISIL) 250 MG Oral Tab 30 tablet 2     Sig: Take 1 tablet (250 mg total) by mouth daily.   .     LABORATORY & ORDERS:   Orders Placed This Encounter   Procedures    AST (SGOT) [E]    ALT(SGPT) [E]       RECOMMENDATIONS given include: keep affected area clean and dry, especially between the toes, watch for signs of infection such as redness, swelling, or discharge, wear absorbent cotton socks, and air out shoes when not wearing them.    FOLLOW-UP: Schedule a follow-up visit in 3 months.           2. Primary insomnia    MEDICATIONS: Melatonin OTC       RECOMMENDATIONS given include: avoid sleeping on back, avoid alcohol, avoid caffeine, adhere to a proper sleep hygiene schedule, get regular exercise, maintain a sleep diary, reduce stress.   FOLLOW-UP: Advised to call if there is no improvement in 7 day(s).   Schedule follow-up appointments on a p.r.n. basis.                          Orders This  Visit:  Orders Placed This Encounter   Procedures    AST (SGOT) [E]    ALT(SGPT) [E]       Meds This Visit:  Requested Prescriptions     Signed Prescriptions Disp Refills    terbinafine (LAMISIL) 250 MG Oral Tab 30 tablet 2     Sig: Take 1 tablet (250 mg total) by mouth daily.       Imaging & Referrals:  None         SE TELLO MD

## 2025-03-17 ENCOUNTER — MED REC SCAN ONLY (OUTPATIENT)
Dept: FAMILY MEDICINE CLINIC | Facility: CLINIC | Age: 25
End: 2025-03-17

## 2025-04-12 ENCOUNTER — MED REC SCAN ONLY (OUTPATIENT)
Dept: FAMILY MEDICINE CLINIC | Facility: CLINIC | Age: 25
End: 2025-04-12

## (undated) NOTE — LETTER
AUTHORIZATION FOR SURGICAL OPERATION OR OTHER PROCEDURE    1.  I hereby authorize Dr. Sherry Magallanes, and [facility], [facility] staff assigned to my case to perform the following operation and/or procedure at the [facility], [facility] [place] (please print)      Patient signature:  ___________________________________________________             Relationship to Patient:             Parent    Responsible person                            Spouse  In case of minor or                     Other  ____

## (undated) NOTE — LETTER
5/7/2024              Luis Calhoun        [address] [facility] [address]         To Whom it may concern:    This is to certify that Luis Calhoun had an appointment on 5/7/2024 at 10:06 AM with SE TELLO MD.        Sincerely,        SE TELLO MD  [facility], [address] [address] [phone]

## (undated) NOTE — LETTER
December 18, 2017     [address]      Dear Jacquelyn Yin:    Below are the results from your recent visit:  results are within normal limits.      Resulted Orders   LIPID PANEL   Result Value Ref Range    HDL C

## (undated) NOTE — LETTER
May 9, 2024     Luis Calhoun  [address] [facility] [address]      Dear Luis:    Below are the results from your recent visit:    Resulted Orders   Comp Metabolic Panel (14)   Result Value Ref Range    Glucose 99 70 - 99 mg/dL    Sodium 143 136 - 145 mmol/L    Potassium 4.3 3.5 - 5.1 mmol/L    Chloride 109 98 - 112 mmol/L    CO2 28.0 21.0 - 32.0 mmol/L    Anion Gap 6 0 - 18 mmol/L    BUN 19 9 - 23 mg/dL    Creatinine 1.14 0.70 - 1.30 mg/dL    BUN/CREA Ratio 16.7 10.0 - 20.0    Calcium, Total 9.6 8.7 - 10.4 mg/dL    Calculated Osmolality 298 (H) 275 - 295 mOsm/kg    eGFR-Cr 92 >=60 mL/min/1.73m2    ALT 30 10 - 49 U/L    AST 20 <=34 U/L    Alkaline Phosphatase 98 45 - 117 U/L    Bilirubin, Total 0.8 0.3 - 1.2 mg/dL    Total Protein 7.4 5.7 - 8.2 g/dL    Albumin 4.5 3.2 - 4.8 g/dL    Globulin  2.9 2.0 - 3.5 g/dL    A/G Ratio 1.6 1.0 - 2.0    Patient Fasting for CMP? Yes    Hemoglobin A1C   Result Value Ref Range    HgbA1C 5.4 <5.7 %    Estimated Average Glucose 108 68 - 126 mg/dL   Lipid Panel   Result Value Ref Range    Cholesterol, Total 174 <200 mg/dL    HDL Cholesterol 63 (H) 40 - 59 mg/dL    Triglycerides 74 30 - 149 mg/dL    LDL Cholesterol 97 <100 mg/dL    VLDL 12 0 - 30 mg/dL    Non HDL Chol 111 <130 mg/dL    Patient Fasting for Lipid? Yes    TSH W Reflex To Free T4   Result Value Ref Range    TSH 1.222 0.550 - 4.780 mIU/mL   Urinalysis with Culture Reflex   Result Value Ref Range    Urine Color Yellow Yellow    Clarity Urine Ex.Turbid (A) Clear    Spec Gravity 1.028 1.005 - 1.030    Glucose Urine Normal Normal mg/dL    Bilirubin Urine Negative Negative    Ketones Urine Negative Negative mg/dL    Blood Urine Negative Negative    pH Urine 6.0 5.0 - 8.0    Protein Urine Negative Negative mg/dL    Urobilinogen Urine Normal Normal    Nitrite Urine Negative Negative    Leukocyte Esterase Urine Negative Negative    WBC Urine 1-5 0 - 5 /HPF    RBC Urine 0-2 0 - 2 /HPF    Bacteria Urine None  Seen None Seen /HPF    Squamous Epi. Cells None Seen None Seen /HPF    Renal Tubular Epithelial Cells None Seen None Seen /HPF    Transitional Cells None Seen None Seen /HPF    Yeast Urine None Seen None Seen /HPF   CBC W/ DIFFERENTIAL   Result Value Ref Range    WBC 5.7 4.0 - 11.0 x10(3) uL    RBC 5.65 4.30 - 5.70 x10(6)uL    HGB 15.1 13.0 - 17.5 g/dL    HCT 47.8 39.0 - 53.0 %    MCV 84.6 80.0 - 100.0 fL    MCH 26.7 26.0 - 34.0 pg    MCHC 31.6 31.0 - 37.0 g/dL    RDW-SD 41.1 35.1 - 46.3 fL    RDW 13.2 11.0 - 15.0 %    .0 150.0 - 450.0 10(3)uL    Neutrophil Absolute Prelim 2.62 1.50 - 7.70 x10 (3) uL    Neutrophil Absolute 2.62 1.50 - 7.70 x10(3) uL    Lymphocyte Absolute 2.32 1.00 - 4.00 x10(3) uL    Monocyte Absolute 0.45 0.10 - 1.00 x10(3) uL    Eosinophil Absolute 0.28 0.00 - 0.70 x10(3) uL    Basophil Absolute 0.03 0.00 - 0.20 x10(3) uL    Immature Granulocyte Absolute 0.01 0.00 - 1.00 x10(3) uL    Neutrophil % 45.9 %    Lymphocyte % 40.6 %    Monocyte % 7.9 %    Eosinophil % 4.9 %    Basophil % 0.5 %    Immature Granulocyte % 0.2 %       The following tests results were within normal limits; Complete Blood Count, Comprehensive Metabolic Panel, Lipids, Thyroid, Urinalysis, and A1c (level of sugar). Your blood test showed low levels of vitamin D. A prescription was sent to your pharmacy to take one capsule or tablet, once a week. This Rx is good for one year. We'll recheck levels in one year. If you have any questions please don't hesitate to call us at [phone] Thank you.